# Patient Record
Sex: FEMALE | Race: OTHER | NOT HISPANIC OR LATINO | URBAN - METROPOLITAN AREA
[De-identification: names, ages, dates, MRNs, and addresses within clinical notes are randomized per-mention and may not be internally consistent; named-entity substitution may affect disease eponyms.]

---

## 2018-08-15 ENCOUNTER — EMERGENCY (EMERGENCY)
Facility: HOSPITAL | Age: 28
LOS: 1 days | Discharge: HOME | End: 2018-08-15
Admitting: EMERGENCY MEDICINE
Payer: COMMERCIAL

## 2018-08-15 VITALS
DIASTOLIC BLOOD PRESSURE: 79 MMHG | SYSTOLIC BLOOD PRESSURE: 113 MMHG | OXYGEN SATURATION: 100 % | TEMPERATURE: 98 F | HEART RATE: 83 BPM | RESPIRATION RATE: 20 BRPM
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
## 2018-08-15 DIAGNOSIS — S91.312A LACERATION WITHOUT FOREIGN BODY, LEFT FOOT, INITIAL ENCOUNTER: ICD-10-CM

## 2018-08-15 DIAGNOSIS — Z23 ENCOUNTER FOR IMMUNIZATION: ICD-10-CM
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
## 2018-08-15 PROCEDURE — 99283 EMERGENCY DEPT VISIT LOW MDM: CPT | Mod: 25

## 2018-08-15 PROCEDURE — 12001 RPR S/N/AX/GEN/TRNK 2.5CM/<: CPT

## 2018-08-15 RX ORDER — CEPHALEXIN 500 MG
1 CAPSULE ORAL
Qty: 12 | Refills: 0 | OUTPATIENT
Start: 2018-08-15 | End: 2018-08-17

## 2018-08-15 RX ORDER — TETANUS TOXOID, REDUCED DIPHTHERIA TOXOID AND ACELLULAR PERTUSSIS VACCINE, ADSORBED 5; 2.5; 8; 8; 2.5 [IU]/.5ML; [IU]/.5ML; UG/.5ML; UG/.5ML; UG/.5ML
0.5 SUSPENSION INTRAMUSCULAR ONCE
Qty: 0 | Refills: 0 | Status: COMPLETED | OUTPATIENT
Start: 2018-08-15 | End: 2018-08-15

## 2018-08-15 RX ADMIN — TETANUS TOXOID, REDUCED DIPHTHERIA TOXOID AND ACELLULAR PERTUSSIS VACCINE, ADSORBED 0.5 MILLILITER(S): 5; 2.5; 8; 8; 2.5 SUSPENSION INTRAMUSCULAR at 21:35

## 2018-08-15 NOTE — ED PROVIDER NOTE - OBJECTIVE STATEMENT
27 y/o F   no pmhx    Pt presents to ER with c/o laceration to top of L foot. States she cut if on a metal ledge at a spa immediately prior to arrival. Does not know last tetanus

## 2018-08-15 NOTE — ED ADULT NURSE NOTE - OBJECTIVE STATEMENT
Pt is a 28y female complaining of laceration to the top of her left foot after hitting at a spa on an unknown object. No bleeding at site. Pt has FROM of extremities. Pt denies chest pain, sob, nausea, vomiting, fever, and chills.

## 2018-08-15 NOTE — ED ADULT TRIAGE NOTE - CHIEF COMPLAINT QUOTE
Patient was at the spa when she cut the top of her foot on a metal object; not up to date on tetanus; area wrapped by EMS; no further bleeding

## 2018-08-15 NOTE — ED ADULT NURSE NOTE - NSIMPLEMENTINTERV_GEN_ALL_ED
Implemented All Universal Safety Interventions:  [place] to call system. Call bell, personal items and telephone within reach. Instruct patient to call for assistance. Room bathroom lighting operational. Non-slip footwear when patient is off stretcher. Physically safe environment: no spills, clutter or unnecessary equipment. Stretcher in lowest position, wheels locked, appropriate side rails in place.

## 2018-08-15 NOTE — ED PROVIDER NOTE - PHYSICAL EXAMINATION
L foot: 2cm superficial laceration to top of foot. no active bleeding, clean, no foreign body.     VITAL SIGNS: I have reviewed nursing notes and confirm.  CONSTITUTIONAL: Well-developed; well-nourished; in no acute distress.  SKIN: Skin is warm and dry, no acute rash.  HEAD: Normocephalic; atraumatic.  EYES: PERRL, EOM intact; conjunctiva and sclera clear.  ENT: No nasal discharge; airway clear.  NECK: Supple; non tender.  CARD: S1, S2 normal; no murmurs, gallops, or rubs. Regular rate and rhythm.  RESP: No wheezes, rales or rhonchi.  ABD: Normal bowel sounds; soft; non-distended; non-tender; no hepatosplenomegaly.  EXT: Normal ROM. No clubbing, cyanosis or edema.  NEURO: Alert, oriented. Grossly unremarkable.  PSYCH: Cooperative, appropriate.

## 2024-05-28 PROBLEM — Z00.00 ENCOUNTER FOR PREVENTIVE HEALTH EXAMINATION: Status: ACTIVE | Noted: 2024-05-28

## 2024-05-30 ENCOUNTER — APPOINTMENT (OUTPATIENT)
Dept: ENDOCRINOLOGY | Facility: CLINIC | Age: 34
End: 2024-05-30
Payer: COMMERCIAL

## 2024-05-30 ENCOUNTER — NON-APPOINTMENT (OUTPATIENT)
Age: 34
End: 2024-05-30

## 2024-05-30 VITALS
BODY MASS INDEX: 20.02 KG/M2 | WEIGHT: 113 LBS | HEART RATE: 84 BPM | DIASTOLIC BLOOD PRESSURE: 76 MMHG | SYSTOLIC BLOOD PRESSURE: 112 MMHG | HEIGHT: 63 IN

## 2024-05-30 DIAGNOSIS — Z83.3 FAMILY HISTORY OF DIABETES MELLITUS: ICD-10-CM

## 2024-05-30 DIAGNOSIS — O24.419 GESTATIONAL DIABETES MELLITUS IN PREGNANCY, UNSPECIFIED CONTROL: ICD-10-CM

## 2024-05-30 LAB
GLUCOSE BLDC GLUCOMTR-MCNC: 77
HBA1C MFR BLD HPLC: 5.4

## 2024-05-30 PROCEDURE — 82962 GLUCOSE BLOOD TEST: CPT

## 2024-05-30 PROCEDURE — 99204 OFFICE O/P NEW MOD 45 MIN: CPT | Mod: 25

## 2024-05-30 PROCEDURE — 83036 HEMOGLOBIN GLYCOSYLATED A1C: CPT | Mod: QW

## 2024-05-30 RX ORDER — FLASH GLUCOSE SENSOR
KIT MISCELLANEOUS
Qty: 2 | Refills: 3 | Status: ACTIVE | COMMUNITY
Start: 2024-05-30 | End: 1900-01-01

## 2024-05-30 RX ORDER — BACILLUS COAGULANS/INULIN 1B-250 MG
CAPSULE ORAL
Refills: 0 | Status: ACTIVE | COMMUNITY

## 2024-05-30 RX ORDER — IRON/IRON ASP GLY/FA/MV-MIN 38 125-25-1MG
TABLET ORAL
Refills: 0 | Status: ACTIVE | COMMUNITY

## 2024-05-30 RX ORDER — FLASH GLUCOSE SCANNING READER
EACH MISCELLANEOUS
Qty: 1 | Refills: 0 | Status: ACTIVE | COMMUNITY
Start: 2024-05-30 | End: 2024-08-28

## 2024-05-30 NOTE — DATA REVIEWED
[FreeTextEntry1] : Summarized as per HPI; see scanned reports.  Laboratories (December 22, 2023) reviewed and significant for:  TSH 0.493 uIU/mL Free T4 1.19 ng/dL

## 2024-05-30 NOTE — ASSESSMENT
[FreeTextEntry1] : Gestational diabetes.  Glucose control is adequate per her report and insulin therapy is not needed at this time.  We discussed lifestyle modification to control blood sugars.  We discussed referral to nutrition and diabetes education. We discussed limiting carbohydrates to 45 g per meal.  We discussed monitoring blood sugars four times daily (preprandial and either 1- or 2-hours postprandial), with the following goals:  Preprandial: 95 mg/dl or less 1-hour postprandial: 140 mg/dl or less 2-hours postprandial: 120 mg/dl or less  We discussed the increased risk for type 2 diabetes mellitus following a diagnosis of gestational diabetes.   I have requested weekly calls from our nurse practitioner through the time of her delivery to check in regarding blood glucose control.  CC: Dr. Binu Cagle, Fax [phone]

## 2024-05-30 NOTE — PHYSICAL EXAM
[Alert] : alert [Healthy Appearance] : healthy appearance [No Acute Distress] : no acute distress [Normal Sclera/Conjunctiva] : normal sclera/conjunctiva [Normal Hearing] : hearing was normal [No Neck Mass] : no neck mass was observed [No LAD] : no lymphadenopathy [Supple] : the neck was supple [Thyroid Not Enlarged] : the thyroid was not enlarged [No Respiratory Distress] : no respiratory distress [Clear to Auscultation] : lungs were clear to auscultation bilaterally [Normal S1, S2] : normal S1 and S2 [Normal Rate] : heart rate was normal [Regular Rhythm] : with a regular rhythm [Kyphosis] : no kyphosis present [No Stigmata of Cushings Syndrome] : no stigmata of Cushings Syndrome [Normal Gait] : normal gait [Acanthosis Nigricans] : no acanthosis nigricans [Normal Insight/Judgement] : insight and judgment were intact [de-identified] : no moon facies, no supraclavicular fat pads

## 2024-05-30 NOTE — HISTORY OF PRESENT ILLNESS
[FreeTextEntry1] : Ms. Michael is a 33 year-old  woman presenting to establish care with me for gestational diabetes. Her last menstrual period was 2023. She is currently at 35 weeks 5 days gestation.   Gestational diabetes. Point-of-care HbA1c 5.4% and glucose 77 mg/dL today. She was diagnosed with gestational diabetes based on a positive three-hour glucose tolerance test by the Martin/Coustan criteria, with values of 74 mg/dL fasting, 116 mg/dL at 1 hour, 178 mg/dL at 2 hours (>154 mg/dL positive), and 144 mg/dL at 3 hours (>140 mg/dL positive).  She has been monitoring blood sugars at home. Fasting values have ranged 79 to 97 mg/dL. 1 hour values have ranged 96 to 140 mg/dL. She had a single postprandial value of 160 mg/dL on a day when she had more sweets/carbohydrates.  She did not have gestational diabetes with her previous pregnancies.  Maternal grandmother with history of diabetes.

## 2024-05-31 ENCOUNTER — NON-APPOINTMENT (OUTPATIENT)
Age: 34
End: 2024-05-31

## 2024-06-06 ENCOUNTER — NON-APPOINTMENT (OUTPATIENT)
Age: 34
End: 2024-06-06

## 2024-08-05 ENCOUNTER — APPOINTMENT (OUTPATIENT)
Dept: ENDOCRINOLOGY | Facility: CLINIC | Age: 34
End: 2024-08-05

## 2025-03-10 NOTE — ED ADULT NURSE NOTE - CAS DISCH ACCOMP BY
March 10, 2025  Re: Goyo Cole  YOB: 1944    Dear Colleague,    Thank you for your referral to the [facility]. We have been unable to schedule the referral after several contact attempts.      If you have any questions or concerns, please contact our office at Dept: [phone].        Sincerely,  [facility]           Self